# Patient Record
Sex: MALE | Race: WHITE | Employment: FULL TIME | URBAN - METROPOLITAN AREA
[De-identification: names, ages, dates, MRNs, and addresses within clinical notes are randomized per-mention and may not be internally consistent; named-entity substitution may affect disease eponyms.]

---

## 2019-03-19 ENCOUNTER — HOSPITAL ENCOUNTER (OUTPATIENT)
Dept: NUCLEAR MEDICINE | Age: 49
Discharge: HOME | End: 2019-03-21
Payer: COMMERCIAL

## 2019-03-19 ENCOUNTER — HOSPITAL ENCOUNTER (OUTPATIENT)
Dept: ULTRASOUND IMAGING | Age: 49
Discharge: HOME | End: 2019-03-21
Payer: COMMERCIAL

## 2019-03-19 VITALS — BODY MASS INDEX: 32.18 KG/M2 | WEIGHT: 205 LBS | HEIGHT: 67 IN

## 2019-03-19 DIAGNOSIS — R10.11 RIGHT UPPER QUADRANT PAIN: ICD-10-CM

## 2019-03-19 DIAGNOSIS — R10.11 RUQ PAIN: ICD-10-CM

## 2019-03-19 PROCEDURE — 3430000000 HC RX DIAGNOSTIC RADIOPHARMACEUTICAL: Performed by: SURGERY

## 2019-03-19 PROCEDURE — A9537 TC99M MEBROFENIN: HCPCS | Performed by: SURGERY

## 2019-03-19 PROCEDURE — 78227 HEPATOBIL SYST IMAGE W/DRUG: CPT

## 2019-03-19 PROCEDURE — 76705 ECHO EXAM OF ABDOMEN: CPT

## 2019-03-19 PROCEDURE — 2580000003 HC RX 258: Performed by: SURGERY

## 2019-03-19 RX ORDER — SODIUM CHLORIDE 0.9 % (FLUSH) 0.9 %
10 SYRINGE (ML) INJECTION PRN
Status: DISCONTINUED | OUTPATIENT
Start: 2019-03-19 | End: 2019-03-22 | Stop reason: HOSPADM

## 2019-03-19 RX ADMIN — Medication 3.2 MILLICURIE: at 11:10

## 2019-03-19 RX ADMIN — Medication 10 ML: at 11:09

## 2019-03-19 RX ADMIN — Medication 10 ML: at 11:10

## 2019-03-22 ENCOUNTER — ANESTHESIA (OUTPATIENT)
Dept: OPERATING ROOM | Age: 49
End: 2019-03-22
Payer: COMMERCIAL

## 2019-03-22 ENCOUNTER — ANESTHESIA EVENT (OUTPATIENT)
Dept: OPERATING ROOM | Age: 49
End: 2019-03-22
Payer: COMMERCIAL

## 2019-03-22 ENCOUNTER — HOSPITAL ENCOUNTER (OUTPATIENT)
Age: 49
Setting detail: OUTPATIENT SURGERY
Discharge: HOME | End: 2019-03-22
Attending: SURGERY | Admitting: SURGERY
Payer: COMMERCIAL

## 2019-03-22 VITALS — OXYGEN SATURATION: 100 % | SYSTOLIC BLOOD PRESSURE: 112 MMHG | TEMPERATURE: 96.4 F | DIASTOLIC BLOOD PRESSURE: 67 MMHG

## 2019-03-22 VITALS
WEIGHT: 205 LBS | BODY MASS INDEX: 32.18 KG/M2 | RESPIRATION RATE: 16 BRPM | HEART RATE: 63 BPM | TEMPERATURE: 96.9 F | HEIGHT: 67 IN | SYSTOLIC BLOOD PRESSURE: 119 MMHG | DIASTOLIC BLOOD PRESSURE: 66 MMHG | OXYGEN SATURATION: 100 %

## 2019-03-22 DIAGNOSIS — K42.9 UMBILICAL HERNIA WITHOUT OBSTRUCTION AND WITHOUT GANGRENE: Primary | ICD-10-CM

## 2019-03-22 LAB
ABSOLUTE EOS #: 0.2 K/UL (ref 0–0.4)
ABSOLUTE IMMATURE GRANULOCYTE: NORMAL K/UL (ref 0–0.3)
ABSOLUTE LYMPH #: 1.8 K/UL (ref 1–4.8)
ABSOLUTE MONO #: 0.3 K/UL (ref 0.1–1.3)
BASOPHILS # BLD: 1 % (ref 0–2)
BASOPHILS ABSOLUTE: 0.1 K/UL (ref 0–0.2)
DIFFERENTIAL TYPE: NORMAL
EOSINOPHILS RELATIVE PERCENT: 3 % (ref 0–4)
HCT VFR BLD CALC: 42.1 % (ref 41–53)
HEMOGLOBIN: 14.6 G/DL (ref 13.5–17.5)
IMMATURE GRANULOCYTES: NORMAL %
LYMPHOCYTES # BLD: 26 % (ref 24–44)
MCH RBC QN AUTO: 32.4 PG (ref 26–34)
MCHC RBC AUTO-ENTMCNC: 34.7 G/DL (ref 31–37)
MCV RBC AUTO: 93.3 FL (ref 80–100)
MONOCYTES # BLD: 5 % (ref 1–7)
NRBC AUTOMATED: NORMAL PER 100 WBC
PDW BLD-RTO: 12.4 % (ref 11.5–14.9)
PLATELET # BLD: 174 K/UL (ref 150–450)
PLATELET ESTIMATE: NORMAL
PMV BLD AUTO: 8.1 FL (ref 6–12)
RBC # BLD: 4.51 M/UL (ref 4.5–5.9)
RBC # BLD: NORMAL 10*6/UL
SEG NEUTROPHILS: 65 % (ref 36–66)
SEGMENTED NEUTROPHILS ABSOLUTE COUNT: 4.6 K/UL (ref 1.3–9.1)
WBC # BLD: 7.1 K/UL (ref 3.5–11)
WBC # BLD: NORMAL 10*3/UL

## 2019-03-22 PROCEDURE — 2500000003 HC RX 250 WO HCPCS: Performed by: SURGERY

## 2019-03-22 PROCEDURE — 2709999900 HC NON-CHARGEABLE SUPPLY: Performed by: SURGERY

## 2019-03-22 PROCEDURE — 2500000003 HC RX 250 WO HCPCS: Performed by: NURSE ANESTHETIST, CERTIFIED REGISTERED

## 2019-03-22 PROCEDURE — 3600000002 HC SURGERY LEVEL 2 BASE: Performed by: SURGERY

## 2019-03-22 PROCEDURE — 7100000000 HC PACU RECOVERY - FIRST 15 MIN: Performed by: SURGERY

## 2019-03-22 PROCEDURE — 85025 COMPLETE CBC W/AUTO DIFF WBC: CPT

## 2019-03-22 PROCEDURE — 36415 COLL VENOUS BLD VENIPUNCTURE: CPT

## 2019-03-22 PROCEDURE — 7100000031 HC ASPR PHASE II RECOVERY - ADDTL 15 MIN: Performed by: SURGERY

## 2019-03-22 PROCEDURE — 3700000001 HC ADD 15 MINUTES (ANESTHESIA): Performed by: SURGERY

## 2019-03-22 PROCEDURE — C1781 MESH (IMPLANTABLE): HCPCS | Performed by: SURGERY

## 2019-03-22 PROCEDURE — 2580000003 HC RX 258: Performed by: ANESTHESIOLOGY

## 2019-03-22 PROCEDURE — 7100000011 HC PHASE II RECOVERY - ADDTL 15 MIN: Performed by: SURGERY

## 2019-03-22 PROCEDURE — 6360000002 HC RX W HCPCS: Performed by: SURGERY

## 2019-03-22 PROCEDURE — 7100000010 HC PHASE II RECOVERY - FIRST 15 MIN: Performed by: SURGERY

## 2019-03-22 PROCEDURE — 3700000000 HC ANESTHESIA ATTENDED CARE: Performed by: SURGERY

## 2019-03-22 PROCEDURE — 3600000012 HC SURGERY LEVEL 2 ADDTL 15MIN: Performed by: SURGERY

## 2019-03-22 PROCEDURE — 6360000002 HC RX W HCPCS: Performed by: NURSE ANESTHETIST, CERTIFIED REGISTERED

## 2019-03-22 PROCEDURE — 2580000003 HC RX 258: Performed by: NURSE ANESTHETIST, CERTIFIED REGISTERED

## 2019-03-22 PROCEDURE — 6360000002 HC RX W HCPCS: Performed by: ANESTHESIOLOGY

## 2019-03-22 PROCEDURE — 7100000001 HC PACU RECOVERY - ADDTL 15 MIN: Performed by: SURGERY

## 2019-03-22 PROCEDURE — 6370000000 HC RX 637 (ALT 250 FOR IP): Performed by: ANESTHESIOLOGY

## 2019-03-22 PROCEDURE — 7100000030 HC ASPR PHASE II RECOVERY - FIRST 15 MIN: Performed by: SURGERY

## 2019-03-22 DEVICE — IMPLANTABLE DEVICE: Type: IMPLANTABLE DEVICE | Site: ABDOMEN | Status: FUNCTIONAL

## 2019-03-22 RX ORDER — LIDOCAINE HYDROCHLORIDE 10 MG/ML
INJECTION, SOLUTION EPIDURAL; INFILTRATION; INTRACAUDAL; PERINEURAL PRN
Status: DISCONTINUED | OUTPATIENT
Start: 2019-03-22 | End: 2019-03-22 | Stop reason: SDUPTHER

## 2019-03-22 RX ORDER — MIDAZOLAM HYDROCHLORIDE 1 MG/ML
INJECTION INTRAMUSCULAR; INTRAVENOUS PRN
Status: DISCONTINUED | OUTPATIENT
Start: 2019-03-22 | End: 2019-03-22 | Stop reason: SDUPTHER

## 2019-03-22 RX ORDER — HYDRALAZINE HYDROCHLORIDE 20 MG/ML
5 INJECTION INTRAMUSCULAR; INTRAVENOUS EVERY 10 MIN PRN
Status: DISCONTINUED | OUTPATIENT
Start: 2019-03-22 | End: 2019-03-22 | Stop reason: HOSPADM

## 2019-03-22 RX ORDER — FENTANYL CITRATE 50 UG/ML
25 INJECTION, SOLUTION INTRAMUSCULAR; INTRAVENOUS EVERY 5 MIN PRN
Status: DISCONTINUED | OUTPATIENT
Start: 2019-03-22 | End: 2019-03-22 | Stop reason: HOSPADM

## 2019-03-22 RX ORDER — IBUPROFEN 400 MG/1
400 TABLET ORAL EVERY 6 HOURS PRN
COMMUNITY

## 2019-03-22 RX ORDER — ONDANSETRON 2 MG/ML
INJECTION INTRAMUSCULAR; INTRAVENOUS PRN
Status: DISCONTINUED | OUTPATIENT
Start: 2019-03-22 | End: 2019-03-22 | Stop reason: SDUPTHER

## 2019-03-22 RX ORDER — LABETALOL HYDROCHLORIDE 5 MG/ML
5 INJECTION, SOLUTION INTRAVENOUS EVERY 10 MIN PRN
Status: DISCONTINUED | OUTPATIENT
Start: 2019-03-22 | End: 2019-03-22 | Stop reason: HOSPADM

## 2019-03-22 RX ORDER — CEPHALEXIN 500 MG/1
CAPSULE ORAL
Qty: 21 CAPSULE | Refills: 0 | Status: ON HOLD | OUTPATIENT
Start: 2019-03-22 | End: 2021-12-03 | Stop reason: ALTCHOICE

## 2019-03-22 RX ORDER — ONDANSETRON 2 MG/ML
4 INJECTION INTRAMUSCULAR; INTRAVENOUS
Status: DISCONTINUED | OUTPATIENT
Start: 2019-03-22 | End: 2019-03-22 | Stop reason: HOSPADM

## 2019-03-22 RX ORDER — HYDROCODONE BITARTRATE AND ACETAMINOPHEN 5; 325 MG/1; MG/1
1 TABLET ORAL EVERY 4 HOURS PRN
Qty: 30 TABLET | Refills: 0 | Status: SHIPPED | OUTPATIENT
Start: 2019-03-22 | End: 2019-03-27

## 2019-03-22 RX ORDER — SODIUM CHLORIDE, SODIUM LACTATE, POTASSIUM CHLORIDE, CALCIUM CHLORIDE 600; 310; 30; 20 MG/100ML; MG/100ML; MG/100ML; MG/100ML
INJECTION, SOLUTION INTRAVENOUS CONTINUOUS
Status: DISCONTINUED | OUTPATIENT
Start: 2019-03-22 | End: 2019-03-22 | Stop reason: HOSPADM

## 2019-03-22 RX ORDER — FENTANYL CITRATE 50 UG/ML
50 INJECTION, SOLUTION INTRAMUSCULAR; INTRAVENOUS EVERY 5 MIN PRN
Status: DISCONTINUED | OUTPATIENT
Start: 2019-03-22 | End: 2019-03-22 | Stop reason: HOSPADM

## 2019-03-22 RX ORDER — ACETAMINOPHEN 500 MG
500 TABLET ORAL EVERY 6 HOURS PRN
COMMUNITY

## 2019-03-22 RX ORDER — MORPHINE SULFATE 2 MG/ML
2 INJECTION, SOLUTION INTRAMUSCULAR; INTRAVENOUS EVERY 5 MIN PRN
Status: DISCONTINUED | OUTPATIENT
Start: 2019-03-22 | End: 2019-03-22 | Stop reason: HOSPADM

## 2019-03-22 RX ORDER — ONDANSETRON 4 MG/1
TABLET, FILM COATED ORAL
Qty: 20 TABLET | Refills: 0 | Status: ON HOLD | OUTPATIENT
Start: 2019-03-22 | End: 2021-12-03 | Stop reason: ALTCHOICE

## 2019-03-22 RX ORDER — SCOLOPAMINE TRANSDERMAL SYSTEM 1 MG/1
1 PATCH, EXTENDED RELEASE TRANSDERMAL ONCE
Status: DISCONTINUED | OUTPATIENT
Start: 2019-03-22 | End: 2019-03-22 | Stop reason: HOSPADM

## 2019-03-22 RX ORDER — KETOROLAC TROMETHAMINE 30 MG/ML
INJECTION, SOLUTION INTRAMUSCULAR; INTRAVENOUS PRN
Status: DISCONTINUED | OUTPATIENT
Start: 2019-03-22 | End: 2019-03-22 | Stop reason: SDUPTHER

## 2019-03-22 RX ORDER — GLYCOPYRROLATE 1 MG/5 ML
SYRINGE (ML) INTRAVENOUS PRN
Status: DISCONTINUED | OUTPATIENT
Start: 2019-03-22 | End: 2019-03-22 | Stop reason: SDUPTHER

## 2019-03-22 RX ORDER — PROPOFOL 10 MG/ML
INJECTION, EMULSION INTRAVENOUS PRN
Status: DISCONTINUED | OUTPATIENT
Start: 2019-03-22 | End: 2019-03-22 | Stop reason: SDUPTHER

## 2019-03-22 RX ORDER — ROCURONIUM BROMIDE 10 MG/ML
INJECTION, SOLUTION INTRAVENOUS PRN
Status: DISCONTINUED | OUTPATIENT
Start: 2019-03-22 | End: 2019-03-22 | Stop reason: SDUPTHER

## 2019-03-22 RX ORDER — DEXAMETHASONE SODIUM PHOSPHATE 4 MG/ML
INJECTION, SOLUTION INTRA-ARTICULAR; INTRALESIONAL; INTRAMUSCULAR; INTRAVENOUS; SOFT TISSUE PRN
Status: DISCONTINUED | OUTPATIENT
Start: 2019-03-22 | End: 2019-03-22 | Stop reason: SDUPTHER

## 2019-03-22 RX ORDER — SODIUM CHLORIDE, SODIUM LACTATE, POTASSIUM CHLORIDE, CALCIUM CHLORIDE 600; 310; 30; 20 MG/100ML; MG/100ML; MG/100ML; MG/100ML
INJECTION, SOLUTION INTRAVENOUS CONTINUOUS PRN
Status: DISCONTINUED | OUTPATIENT
Start: 2019-03-22 | End: 2019-03-22 | Stop reason: SDUPTHER

## 2019-03-22 RX ORDER — BUPIVACAINE HYDROCHLORIDE 5 MG/ML
INJECTION, SOLUTION EPIDURAL; INTRACAUDAL PRN
Status: DISCONTINUED | OUTPATIENT
Start: 2019-03-22 | End: 2019-03-22 | Stop reason: ALTCHOICE

## 2019-03-22 RX ORDER — METOCLOPRAMIDE HYDROCHLORIDE 5 MG/ML
10 INJECTION INTRAMUSCULAR; INTRAVENOUS
Status: DISCONTINUED | OUTPATIENT
Start: 2019-03-22 | End: 2019-03-22 | Stop reason: HOSPADM

## 2019-03-22 RX ORDER — NEOSTIGMINE METHYLSULFATE 5 MG/5 ML
SYRINGE (ML) INTRAVENOUS PRN
Status: DISCONTINUED | OUTPATIENT
Start: 2019-03-22 | End: 2019-03-22 | Stop reason: SDUPTHER

## 2019-03-22 RX ORDER — FENTANYL CITRATE 50 UG/ML
INJECTION, SOLUTION INTRAMUSCULAR; INTRAVENOUS PRN
Status: DISCONTINUED | OUTPATIENT
Start: 2019-03-22 | End: 2019-03-22 | Stop reason: SDUPTHER

## 2019-03-22 RX ORDER — DIPHENHYDRAMINE HYDROCHLORIDE 50 MG/ML
12.5 INJECTION INTRAMUSCULAR; INTRAVENOUS
Status: DISCONTINUED | OUTPATIENT
Start: 2019-03-22 | End: 2019-03-22 | Stop reason: HOSPADM

## 2019-03-22 RX ORDER — MEPERIDINE HYDROCHLORIDE 50 MG/ML
12.5 INJECTION INTRAMUSCULAR; INTRAVENOUS; SUBCUTANEOUS EVERY 5 MIN PRN
Status: DISCONTINUED | OUTPATIENT
Start: 2019-03-22 | End: 2019-03-22 | Stop reason: HOSPADM

## 2019-03-22 RX ADMIN — LIDOCAINE HYDROCHLORIDE 50 MG: 10 INJECTION, SOLUTION EPIDURAL; INFILTRATION; INTRACAUDAL; PERINEURAL at 13:52

## 2019-03-22 RX ADMIN — HYDROMORPHONE HYDROCHLORIDE 0.5 MG: 1 INJECTION, SOLUTION INTRAMUSCULAR; INTRAVENOUS; SUBCUTANEOUS at 15:06

## 2019-03-22 RX ADMIN — Medication 2 G: at 14:05

## 2019-03-22 RX ADMIN — ROCURONIUM BROMIDE 40 MG: 10 INJECTION INTRAVENOUS at 13:52

## 2019-03-22 RX ADMIN — DEXAMETHASONE SODIUM PHOSPHATE 4 MG: 4 INJECTION, SOLUTION INTRAMUSCULAR; INTRAVENOUS at 14:00

## 2019-03-22 RX ADMIN — Medication 0.6 MG: at 14:42

## 2019-03-22 RX ADMIN — FENTANYL CITRATE 50 MCG: 50 INJECTION, SOLUTION INTRAMUSCULAR; INTRAVENOUS at 14:07

## 2019-03-22 RX ADMIN — FENTANYL CITRATE 100 MCG: 50 INJECTION, SOLUTION INTRAMUSCULAR; INTRAVENOUS at 13:52

## 2019-03-22 RX ADMIN — KETOROLAC TROMETHAMINE 30 MG: 30 INJECTION, SOLUTION INTRAMUSCULAR at 14:32

## 2019-03-22 RX ADMIN — SODIUM CHLORIDE, POTASSIUM CHLORIDE, SODIUM LACTATE AND CALCIUM CHLORIDE: 600; 310; 30; 20 INJECTION, SOLUTION INTRAVENOUS at 13:50

## 2019-03-22 RX ADMIN — SODIUM CHLORIDE, POTASSIUM CHLORIDE, SODIUM LACTATE AND CALCIUM CHLORIDE: 600; 310; 30; 20 INJECTION, SOLUTION INTRAVENOUS at 14:17

## 2019-03-22 RX ADMIN — MIDAZOLAM 2 MG: 1 INJECTION INTRAMUSCULAR; INTRAVENOUS at 13:50

## 2019-03-22 RX ADMIN — SODIUM CHLORIDE, POTASSIUM CHLORIDE, SODIUM LACTATE AND CALCIUM CHLORIDE: 600; 310; 30; 20 INJECTION, SOLUTION INTRAVENOUS at 12:15

## 2019-03-22 RX ADMIN — FENTANYL CITRATE 50 MCG: 50 INJECTION, SOLUTION INTRAMUSCULAR; INTRAVENOUS at 14:51

## 2019-03-22 RX ADMIN — Medication 3 MG: at 14:42

## 2019-03-22 RX ADMIN — ROCURONIUM BROMIDE 10 MG: 10 INJECTION INTRAVENOUS at 14:20

## 2019-03-22 RX ADMIN — PROPOFOL 200 MG: 10 INJECTION, EMULSION INTRAVENOUS at 13:52

## 2019-03-22 RX ADMIN — ONDANSETRON 4 MG: 2 INJECTION INTRAMUSCULAR; INTRAVENOUS at 14:28

## 2019-03-22 ASSESSMENT — PULMONARY FUNCTION TESTS
PIF_VALUE: 12
PIF_VALUE: 16
PIF_VALUE: 15
PIF_VALUE: 16
PIF_VALUE: 12
PIF_VALUE: 17
PIF_VALUE: 16
PIF_VALUE: 16
PIF_VALUE: 15
PIF_VALUE: 12
PIF_VALUE: 16
PIF_VALUE: 16
PIF_VALUE: 7
PIF_VALUE: 16
PIF_VALUE: 1
PIF_VALUE: 12
PIF_VALUE: 16
PIF_VALUE: 12
PIF_VALUE: 12
PIF_VALUE: 14
PIF_VALUE: 1
PIF_VALUE: 10
PIF_VALUE: 16
PIF_VALUE: 31
PIF_VALUE: 16
PIF_VALUE: 12
PIF_VALUE: 16
PIF_VALUE: 15
PIF_VALUE: 25
PIF_VALUE: 1
PIF_VALUE: 16
PIF_VALUE: 1
PIF_VALUE: 16
PIF_VALUE: 12
PIF_VALUE: 27
PIF_VALUE: 17
PIF_VALUE: 16
PIF_VALUE: 17
PIF_VALUE: 17
PIF_VALUE: 16
PIF_VALUE: 25
PIF_VALUE: 13
PIF_VALUE: 17
PIF_VALUE: 16
PIF_VALUE: 16
PIF_VALUE: 15
PIF_VALUE: 12
PIF_VALUE: 16
PIF_VALUE: 12
PIF_VALUE: 16

## 2019-03-22 ASSESSMENT — ENCOUNTER SYMPTOMS
SINUS PAIN: 0
ALLERGIC/IMMUNOLOGIC NEGATIVE: 1
RESPIRATORY NEGATIVE: 1
SORE THROAT: 0

## 2019-03-22 ASSESSMENT — PAIN SCALES - GENERAL
PAINLEVEL_OUTOF10: 4
PAINLEVEL_OUTOF10: 4
PAINLEVEL_OUTOF10: 7
PAINLEVEL_OUTOF10: 4

## 2019-03-22 ASSESSMENT — PAIN DESCRIPTION - ORIENTATION: ORIENTATION: MID

## 2019-03-22 ASSESSMENT — PAIN - FUNCTIONAL ASSESSMENT: PAIN_FUNCTIONAL_ASSESSMENT: 0-10

## 2019-03-22 ASSESSMENT — PAIN DESCRIPTION - LOCATION
LOCATION: UMBILICUS
LOCATION: ABDOMEN

## 2019-03-22 ASSESSMENT — LIFESTYLE VARIABLES: SMOKING_STATUS: 1

## 2019-03-22 ASSESSMENT — PAIN DESCRIPTION - PAIN TYPE
TYPE: SURGICAL PAIN
TYPE: ACUTE PAIN;SURGICAL PAIN

## 2021-11-22 ENCOUNTER — HOSPITAL ENCOUNTER (OUTPATIENT)
Dept: PREADMISSION TESTING | Age: 51
Discharge: HOME | End: 2021-11-26

## 2021-11-22 VITALS — BODY MASS INDEX: 27.92 KG/M2 | WEIGHT: 195 LBS | HEIGHT: 70 IN

## 2021-11-22 NOTE — PROGRESS NOTES
Pre-op Instructions For Out-Patient Surgery    Medication Instructions:  · Please stop herbs and any supplements now (includes vitamins and minerals). · Please contact your surgeon and prescribing physician for pre-op instructions for any blood thinners. Ibuprofen    · If you have inhalers/aerosol treatments at home, please use them the morning of your surgery and bring the inhalers with you to the hospital.    · Please take the following medications the morning of your surgery with a sip of water:    None    Surgery Instructions:  1. After midnight before surgery:  Do not eat or drink anything, including water, mints, gum, and hard candy. You may brush your teeth without swallowing. No smoking, chewing tobacco, or street drugs. 2. Please shower or bathe before surgery. 3. Please do not wear any cologne, lotion, powder, deodorant, jewelry, piercings, perfume, makeup, nail polish, hair accessories, or hair spray on the day of surgery. Wear loose comfortable clothing. 4. Leave your valuables at home. Bring a storage case for any glasses/contacts. 5. An adult who is responsible for you MUST drive you home and should be with you for the first 24 hours after surgery. 6. If having out-patient knee and foot surgeries, please arrange for planned crutches, walker, or wheelchair before arriving to the hospital.    The Day of Surgery:  · Arrive at [address] [place] [address] Surgery Entrance at the time directed by your surgeon and check in at the desk. · If you have a living will or healthcare power of , please bring a copy. · You will be taken to the pre-op holding area where you will be prepared for surgery. A physical assessment will be performed by a nurse practitioner or house officer.   Your IV will be started and you will meet your anesthesiologist.    · When you go to surgery, your family will be directed to the surgical waiting room, where the doctor should speak with them after your surgery. · After surgery, you will be taken to the recovery room then when you are awake and stable you will go to the short stay unit for preparation to be discharged. Only your one designated person is allowed to come to short stay for your discharge. · If you use a Bi-PAP or C-PAP machine, please bring it with you and leave it in the car in case it is needed in recovery room. Instructions read to pt wife, Jaspal Velasquez and understanding verbalized.

## 2021-12-02 ENCOUNTER — ANESTHESIA EVENT (OUTPATIENT)
Dept: ENDOSCOPY | Age: 51
End: 2021-12-02
Payer: COMMERCIAL

## 2021-12-03 ENCOUNTER — HOSPITAL ENCOUNTER (OUTPATIENT)
Age: 51
Setting detail: OUTPATIENT SURGERY
Discharge: HOME | End: 2021-12-03
Attending: SURGERY | Admitting: SURGERY
Payer: COMMERCIAL

## 2021-12-03 ENCOUNTER — ANESTHESIA (OUTPATIENT)
Dept: ENDOSCOPY | Age: 51
End: 2021-12-03
Payer: COMMERCIAL

## 2021-12-03 VITALS
HEIGHT: 70 IN | WEIGHT: 195 LBS | RESPIRATION RATE: 18 BRPM | DIASTOLIC BLOOD PRESSURE: 56 MMHG | TEMPERATURE: 96.8 F | SYSTOLIC BLOOD PRESSURE: 129 MMHG | HEART RATE: 75 BPM | OXYGEN SATURATION: 98 % | BODY MASS INDEX: 27.92 KG/M2

## 2021-12-03 VITALS — SYSTOLIC BLOOD PRESSURE: 137 MMHG | OXYGEN SATURATION: 100 % | TEMPERATURE: 95.4 F | DIASTOLIC BLOOD PRESSURE: 86 MMHG

## 2021-12-03 PROCEDURE — 2580000003 HC RX 258: Performed by: ANESTHESIOLOGY

## 2021-12-03 PROCEDURE — 3700000001 HC ADD 15 MINUTES (ANESTHESIA): Performed by: SURGERY

## 2021-12-03 PROCEDURE — 7100000010 HC PHASE II RECOVERY - FIRST 15 MIN: Performed by: SURGERY

## 2021-12-03 PROCEDURE — 7100000031 HC ASPR PHASE II RECOVERY - ADDTL 15 MIN: Performed by: SURGERY

## 2021-12-03 PROCEDURE — 88305 TISSUE EXAM BY PATHOLOGIST: CPT

## 2021-12-03 PROCEDURE — 6370000000 HC RX 637 (ALT 250 FOR IP): Performed by: ANESTHESIOLOGY

## 2021-12-03 PROCEDURE — 6360000002 HC RX W HCPCS: Performed by: NURSE ANESTHETIST, CERTIFIED REGISTERED

## 2021-12-03 PROCEDURE — 7100000030 HC ASPR PHASE II RECOVERY - FIRST 15 MIN: Performed by: SURGERY

## 2021-12-03 PROCEDURE — 3700000000 HC ANESTHESIA ATTENDED CARE: Performed by: SURGERY

## 2021-12-03 PROCEDURE — 2709999900 HC NON-CHARGEABLE SUPPLY: Performed by: SURGERY

## 2021-12-03 PROCEDURE — 7100000000 HC PACU RECOVERY - FIRST 15 MIN: Performed by: SURGERY

## 2021-12-03 PROCEDURE — 7100000001 HC PACU RECOVERY - ADDTL 15 MIN: Performed by: SURGERY

## 2021-12-03 PROCEDURE — 3609010600 HC COLONOSCOPY POLYPECTOMY SNARE/COLD BIOPSY: Performed by: SURGERY

## 2021-12-03 PROCEDURE — 7100000011 HC PHASE II RECOVERY - ADDTL 15 MIN: Performed by: SURGERY

## 2021-12-03 RX ORDER — SODIUM CHLORIDE 9 MG/ML
25 INJECTION, SOLUTION INTRAVENOUS PRN
Status: DISCONTINUED | OUTPATIENT
Start: 2021-12-03 | End: 2021-12-03 | Stop reason: HOSPADM

## 2021-12-03 RX ORDER — PROPOFOL 10 MG/ML
INJECTION, EMULSION INTRAVENOUS PRN
Status: DISCONTINUED | OUTPATIENT
Start: 2021-12-03 | End: 2021-12-03 | Stop reason: SDUPTHER

## 2021-12-03 RX ORDER — MIDAZOLAM HYDROCHLORIDE 1 MG/ML
INJECTION INTRAMUSCULAR; INTRAVENOUS PRN
Status: DISCONTINUED | OUTPATIENT
Start: 2021-12-03 | End: 2021-12-03 | Stop reason: SDUPTHER

## 2021-12-03 RX ORDER — LIDOCAINE HYDROCHLORIDE 10 MG/ML
1 INJECTION, SOLUTION EPIDURAL; INFILTRATION; INTRACAUDAL; PERINEURAL
Status: DISCONTINUED | OUTPATIENT
Start: 2021-12-03 | End: 2021-12-03 | Stop reason: HOSPADM

## 2021-12-03 RX ORDER — SCOLOPAMINE TRANSDERMAL SYSTEM 1 MG/1
1 PATCH, EXTENDED RELEASE TRANSDERMAL ONCE
Status: DISCONTINUED | OUTPATIENT
Start: 2021-12-03 | End: 2021-12-03 | Stop reason: HOSPADM

## 2021-12-03 RX ORDER — DEXAMETHASONE SODIUM PHOSPHATE 4 MG/ML
INJECTION, SOLUTION INTRA-ARTICULAR; INTRALESIONAL; INTRAMUSCULAR; INTRAVENOUS; SOFT TISSUE PRN
Status: DISCONTINUED | OUTPATIENT
Start: 2021-12-03 | End: 2021-12-03 | Stop reason: SDUPTHER

## 2021-12-03 RX ORDER — SODIUM CHLORIDE 0.9 % (FLUSH) 0.9 %
5-40 SYRINGE (ML) INJECTION PRN
Status: DISCONTINUED | OUTPATIENT
Start: 2021-12-03 | End: 2021-12-03 | Stop reason: HOSPADM

## 2021-12-03 RX ORDER — ONDANSETRON 2 MG/ML
INJECTION INTRAMUSCULAR; INTRAVENOUS PRN
Status: DISCONTINUED | OUTPATIENT
Start: 2021-12-03 | End: 2021-12-03 | Stop reason: SDUPTHER

## 2021-12-03 RX ORDER — SODIUM CHLORIDE, SODIUM LACTATE, POTASSIUM CHLORIDE, CALCIUM CHLORIDE 600; 310; 30; 20 MG/100ML; MG/100ML; MG/100ML; MG/100ML
INJECTION, SOLUTION INTRAVENOUS CONTINUOUS
Status: DISCONTINUED | OUTPATIENT
Start: 2021-12-03 | End: 2021-12-03 | Stop reason: HOSPADM

## 2021-12-03 RX ORDER — FENTANYL CITRATE 50 UG/ML
INJECTION, SOLUTION INTRAMUSCULAR; INTRAVENOUS PRN
Status: DISCONTINUED | OUTPATIENT
Start: 2021-12-03 | End: 2021-12-03 | Stop reason: SDUPTHER

## 2021-12-03 RX ORDER — SODIUM CHLORIDE 0.9 % (FLUSH) 0.9 %
5-40 SYRINGE (ML) INJECTION EVERY 12 HOURS SCHEDULED
Status: DISCONTINUED | OUTPATIENT
Start: 2021-12-03 | End: 2021-12-03 | Stop reason: HOSPADM

## 2021-12-03 RX ORDER — LABETALOL HYDROCHLORIDE 5 MG/ML
5 INJECTION, SOLUTION INTRAVENOUS EVERY 10 MIN PRN
Status: DISCONTINUED | OUTPATIENT
Start: 2021-12-03 | End: 2021-12-03 | Stop reason: HOSPADM

## 2021-12-03 RX ORDER — DIPHENHYDRAMINE HYDROCHLORIDE 50 MG/ML
12.5 INJECTION INTRAMUSCULAR; INTRAVENOUS
Status: DISCONTINUED | OUTPATIENT
Start: 2021-12-03 | End: 2021-12-03 | Stop reason: HOSPADM

## 2021-12-03 RX ORDER — ONDANSETRON 2 MG/ML
4 INJECTION INTRAMUSCULAR; INTRAVENOUS
Status: DISCONTINUED | OUTPATIENT
Start: 2021-12-03 | End: 2021-12-03 | Stop reason: HOSPADM

## 2021-12-03 RX ADMIN — MIDAZOLAM 2 MG: 1 INJECTION INTRAMUSCULAR; INTRAVENOUS at 11:56

## 2021-12-03 RX ADMIN — DEXAMETHASONE SODIUM PHOSPHATE 4 MG: 4 INJECTION, SOLUTION INTRAMUSCULAR; INTRAVENOUS at 12:03

## 2021-12-03 RX ADMIN — SODIUM CHLORIDE, POTASSIUM CHLORIDE, SODIUM LACTATE AND CALCIUM CHLORIDE: 600; 310; 30; 20 INJECTION, SOLUTION INTRAVENOUS at 10:40

## 2021-12-03 RX ADMIN — FENTANYL CITRATE 50 MCG: 50 INJECTION, SOLUTION INTRAMUSCULAR; INTRAVENOUS at 11:56

## 2021-12-03 RX ADMIN — ONDANSETRON 4 MG: 2 INJECTION, SOLUTION INTRAMUSCULAR; INTRAVENOUS at 12:03

## 2021-12-03 RX ADMIN — SODIUM CHLORIDE, POTASSIUM CHLORIDE, SODIUM LACTATE AND CALCIUM CHLORIDE: 600; 310; 30; 20 INJECTION, SOLUTION INTRAVENOUS at 12:37

## 2021-12-03 RX ADMIN — PROPOFOL 200 MG: 10 INJECTION, EMULSION INTRAVENOUS at 11:59

## 2021-12-03 ASSESSMENT — PULMONARY FUNCTION TESTS
PIF_VALUE: 10
PIF_VALUE: 10
PIF_VALUE: 11
PIF_VALUE: 10
PIF_VALUE: 11
PIF_VALUE: 10
PIF_VALUE: 1
PIF_VALUE: 10
PIF_VALUE: 0
PIF_VALUE: 10
PIF_VALUE: 11
PIF_VALUE: 10
PIF_VALUE: 10
PIF_VALUE: 11
PIF_VALUE: 10
PIF_VALUE: 10
PIF_VALUE: 9
PIF_VALUE: 10
PIF_VALUE: 1
PIF_VALUE: 1
PIF_VALUE: 9
PIF_VALUE: 10
PIF_VALUE: 10
PIF_VALUE: 7
PIF_VALUE: 10
PIF_VALUE: 1
PIF_VALUE: 10
PIF_VALUE: 6
PIF_VALUE: 10
PIF_VALUE: 10

## 2021-12-03 ASSESSMENT — LIFESTYLE VARIABLES: SMOKING_STATUS: 1

## 2021-12-03 ASSESSMENT — ENCOUNTER SYMPTOMS
CHEST TIGHTNESS: 0
SINUS PRESSURE: 0
BACK PAIN: 1
APNEA: 0
SORE THROAT: 0
RHINORRHEA: 0
SHORTNESS OF BREATH: 0
SINUS PAIN: 0
WHEEZING: 0
COUGH: 0
TROUBLE SWALLOWING: 0

## 2021-12-03 ASSESSMENT — PAIN - FUNCTIONAL ASSESSMENT: PAIN_FUNCTIONAL_ASSESSMENT: 0-10

## 2021-12-03 ASSESSMENT — PAIN SCALES - GENERAL: PAINLEVEL_OUTOF10: 0

## 2021-12-03 NOTE — ANESTHESIA POSTPROCEDURE EVALUATION
Department of Anesthesiology  Postprocedure Note    Patient: William Stratton  MRN: [id number]  YOB: 1970  Date of evaluation: 12/3/2021  Time:  2:54 PM     Procedure Summary     Date: 12/03/21 Room / Location: [place] [address] / [facility]    Anesthesia Start: 0345 Anesthesia Stop: 9351    Procedure: COLONOSCOPY POLYPECTOMY SNARE/COLD BIOPSY (N/A ) Diagnosis:       (COLON SCREENING)      (PT FULLY VACCINATED)    Surgeons: Jaime Delgado MD Responsible Provider: Sathya Licea MD    Anesthesia Type: general ASA Status: 2          Anesthesia Type: general    Katie Phase I: Katie Score: 10    Katie Phase II: Katie Score: 10    Last vitals: Reviewed and per EMR flowsheets.        Anesthesia Post Evaluation    Comments: POST- ANESTHESIA EVALUATION       Pt Name: William Stratton  MRN: [id number]  YOB: 1970  Date of evaluation: 12/3/2021  Time:  2:54 PM      BP (!) 129/56   Pulse 75   Temp 96.8 °F (36 °C)   Resp 18   Ht 5' 10\" (1.778 m)   Wt 195 lb (88.5 kg)   SpO2 98%   BMI 27.98 kg/m²      Consciousness Level  Awake  Cardiopulmonary Status  Stable  Pain Adequately Treated YES  Nausea / Vomiting  NO  Adequate Hydration  YES  Anesthesia Related Complications NONE      Electronically signed by Sathya Licea MD on 12/3/2021 at 2:54 PM

## 2021-12-03 NOTE — H&P
HISTORY and [address]       NAME:  Lynne Delgado  MRN: [id number]   YOB: 1970   Date: 12/3/2021   Age: 49 y.o. Gender: male       COMPLAINT AND PRESENT HISTORY:   Lynne Delgado  is a 49 y.o. male presenting today for a colorectal cancer screening. Pt denies any gi symptoms including n/v/d/c, no abdominal pain, no bloody or tarry stools. Pt is unaware of any family hx of colon cancer. This is his first colonoscopy. Pt reports completing bowel prep without complications, last BM reported this morning. He states last BM was clear with no stool particles. Pt has been NPO since midnight. Pt does not wear dentures. Pt denies any hx of MRSA infection  Pt does take motrin, last dose over a week ago. Pt does have a hx of PONV  Pt denies any acute symptoms of illness at this time including no SOB, CP, fever, URI or UTI symptoms. RECENT IMAGING    No results found. PAST MEDICAL HISTORY     Past Medical History:   Diagnosis Date    Umbilical hernia        SURGICAL HISTORY       Past Surgical History:   Procedure Laterality Date    ANTERIOR CRUCIATE LIGAMENT REPAIR Right 1997    ANTERIOR CRUCIATE LIGAMENT REPAIR      CERVICAL DISCECTOMY      x 2 (replaced with cadaver)    TONSILLECTOMY      UMBILICAL HERNIA REPAIR N/A 4/29/6315    HERNIA UMBILICAL REPAIR - WITH MESH performed by Chelsi Sofia MD at [address] [place] [address]     No family history on file.     SOCIAL HISTORY       Social History     Socioeconomic History    Marital status:      Spouse name: None    Number of children: None    Years of education: None    Highest education level: None   Occupational History    None   Tobacco Use    Smoking status: Current Every Day Smoker     Packs/day: 1.00    Smokeless tobacco: Never Used   Vaping Use    Vaping Use: Never used   Substance and Sexual Activity    Alcohol use: Yes     Comment: socially    Drug use: Never    Sexual activity: None   Other Topics Concern    None   Social History Narrative    None     Social Determinants of Health     Financial Resource Strain:     Difficulty of Paying Living Expenses: Not on file   Food Insecurity:     Worried About Running Out of Food in the Last Year: Not on file    Santos of Food in the Last Year: Not on file   Transportation Needs:     Lack of Transportation (Medical): Not on file    Lack of Transportation (Non-Medical): Not on file   Physical Activity:     Days of Exercise per Week: Not on file    Minutes of Exercise per Session: Not on file   Stress:     Feeling of Stress : Not on file   Social Connections:     Frequency of Communication with Friends and Family: Not on file    Frequency of Social Gatherings with Friends and Family: Not on file    Attends Mandaen Services: Not on file    Active Member of [address] [place] [address] or Organizations: Not on file    Attends Club or Organization Meetings: Not on file    Marital Status: Not on file   Intimate Partner Violence:     Fear of Current or Ex-Partner: Not on file    Emotionally Abused: Not on file    Physically Abused: Not on file    Sexually Abused: Not on file   Housing Stability:     Unable to Pay for Housing in the Last Year: Not on file    Number of [place] in the Last Year: Not on file    Unstable Housing in the Last Year: Not on file           REVIEW OF SYSTEMS      Allergies   Allergen Reactions    Codeine     Percocet [Oxycodone-Acetaminophen]        No current facility-administered medications on file prior to encounter.      Current Outpatient Medications on File Prior to Encounter   Medication Sig Dispense Refill    acetaminophen (TYLENOL) 500 MG tablet Take 500 mg by mouth every 6 hours as needed for Pain      ibuprofen (ADVIL;MOTRIN) 400 MG tablet Take 400 mg by mouth every 6 hours as needed for Pain      cephALEXin (KEFLEX) 500 MG capsule 500 mgTake three times daily 21 capsule 0    ondansetron (ZOFRAN) 4 MG tablet Take every six hours as needed 20 tablet 0        Review of Systems   Constitutional: Negative for chills, diaphoresis, fatigue and fever. HENT: Negative for congestion, dental problem, ear pain, postnasal drip, rhinorrhea, sinus pressure, sinus pain, sore throat and trouble swallowing. Respiratory: Negative for apnea, cough, chest tightness, shortness of breath and wheezing. Cardiovascular: Negative for chest pain, palpitations and leg swelling. Gastrointestinal:        SEE HPI   Genitourinary: Negative for dysuria, flank pain, frequency and hematuria. Musculoskeletal: Positive for back pain and neck pain. Negative for joint swelling and myalgias. Chronic   Skin: Negative for rash and wound. Neurological: Negative for dizziness, weakness, numbness and headaches. Hematological: Does not bruise/bleed easily. Psychiatric/Behavioral: Negative for agitation and confusion. The patient is not nervous/anxious. See HPI    GENERAL PHYSICAL EXAM:     Vitals: See nurse flowsheet for current vitals. Physical Exam  Constitutional:       General: He is not in acute distress. Appearance: Normal appearance. He is well-developed and normal weight. He is not ill-appearing or toxic-appearing. HENT:      Head: Normocephalic and atraumatic. Mouth/Throat:      Mouth: Mucous membranes are moist.      Pharynx: Oropharynx is clear. No oropharyngeal exudate or posterior oropharyngeal erythema. Eyes:      Extraocular Movements: Extraocular movements intact. Conjunctiva/sclera: Conjunctivae normal.      Pupils: Pupils are equal, round, and reactive to light. Cardiovascular:      Rate and Rhythm: Normal rate and regular rhythm. Pulses: Normal pulses. Heart sounds: Normal heart sounds. No murmur heard. No friction rub. No gallop. Pulmonary:      Effort: Pulmonary effort is normal.      Breath sounds: Wheezing present.       Comments: Posterior lung fields t/o  Abdominal:      General: Bowel sounds are normal. There is no distension. Palpations: Abdomen is soft. Tenderness: There is no abdominal tenderness. There is no guarding or rebound. Musculoskeletal:         General: No swelling. Normal range of motion. Cervical back: Normal range of motion and neck supple. No rigidity or tenderness. Right lower leg: No edema. Left lower leg: No edema. Skin:     General: Skin is warm and dry. Findings: No erythema. Neurological:      General: No focal deficit present. Mental Status: He is alert and oriented to person, place, and time. Mental status is at baseline. Sensory: No sensory deficit. Psychiatric:         Mood and Affect: Mood normal.         Behavior: Behavior normal.         Thought Content: Thought content normal.         Judgment: Judgment normal.                                                                                         PROVISIONAL DIAGNOSES / SURGERY:      COLORECTAL CANCER SCREENING, NOT HIGH RISK    COLON SCREENING    There are no problems to display for this patient.               JOHN Rubio - CNP on 12/3/2021 at 9:55 AM

## 2021-12-03 NOTE — OP NOTE
Operative Note      Patient: Jaydon Graham  YOB: 1970  MRN: [id number]    Date of Procedure: 12/3/2021    PROCEDURE NOTE    DATE OF PROCEDURE: 12/3/2021    SURGEON: Saida Valderrama MD    ASSISTANT: None    PREOPERATIVE DIAGNOSIS: Screening colonoscopy    POSTOPERATIVE DIAGNOSIS: Rectosigmoid polyp    OPERATION: Total colonoscopy to cecum with intubation of terminal ileum. Rectosigmoid polypectomy with cold snare polypectomy technique. ANESTHESIA: General    ESTIMATED BLOOD LOSS: None    COMPLICATIONS: None     SPECIMENS:  Was Obtained: Rectosigmoid polyp    HISTORY: The patient is a 49y.o. year old male with history of above preop diagnosis. I recommended colonoscopy with possible biopsy or polypectomy and I explained the risk, benefits, expected outcome, and alternatives to the procedure. Risks included but are not limited to bleeding, infection, respiratory distress, hypotension, and perforation of the colon and possibility of missing a lesion. The patient understands and is in agreement. PROCEDURE: The patient was given IV conscious sedation. The patient's SPO2 remained above 90% throughout the procedure. Digital rectal exam was normal.  The colonoscope was inserted through the anus into the rectum and advanced under direct vision to the cecum without difficulty. Terminal ileum was examined for approximately 2 inches. The prep was good. Findings:  Terminal ileum: normal    Cecum/Ascending colon: normal    Transverse colon: normal    Descending/Sigmoid colon: normal    Rectum/Anus: examined in normal and retroflexed positions and was abnormal: Rectosigmoid polyp removed with cold snare polypectomy technique retrieved and sent to pathology. Hypertrophied anal papilla. Withdrawal Time was (minutes): 20      Next screening colonoscopy: 5 years. If screening is less than 10 years the recommended reason is due:polyps    The colon was decompressed.   While withdrawing the scope the above findings were verified and the scope was removed. The patient tolerated the procedure and conscious sedation without unusual events. In the recovery room patient was examined and remains hemodynamically stable. Discharge home when criteria met. Recommendations/Plan:   1. F/U Biopsies  2. F/U In Office as instructed  3. Discussed with the family  4. High fiber diet   5.  Precautions to avoid constipation    Electronically signed by Nain Mendez MD  on 12/3/2021 at 12:42 PM

## 2021-12-03 NOTE — ANESTHESIA PRE PROCEDURE
Department of Anesthesiology  Preprocedure Note       Name:  Lili Lindsay   Age:  49 y.o.  :  1970                                          MRN:  [id number]         Date:  12/3/2021      Surgeon: Breana Govea):  Wilhemina Olszewski, MD    Procedure: Procedure(s):  COLORECTAL CANCER SCREENING, NOT HIGH RISK    Medications prior to admission:   Prior to Admission medications    Medication Sig Start Date End Date Taking? Authorizing Provider   acetaminophen (TYLENOL) 500 MG tablet Take 500 mg by mouth every 6 hours as needed for Pain    Historical Provider, MD   ibuprofen (ADVIL;MOTRIN) 400 MG tablet Take 400 mg by mouth every 6 hours as needed for Pain    Historical Provider, MD   cephALEXin (KEFLEX) 500 MG capsule 500 mgTake three times daily 3/22/19   Wilhemina Olszewski, MD   ondansetron (ZOFRAN) 4 MG tablet Take every six hours as needed 3/22/19   Wilhemina Olszewski, MD       Current medications:    Current Facility-Administered Medications   Medication Dose Route Frequency Provider Last Rate Last Admin    lactated ringers infusion   IntraVENous Continuous Rosa Mccoy MD        sodium chloride flush 0.9 % injection 5-40 mL  5-40 mL IntraVENous 2 times per day Rosa Mccoy MD        sodium chloride flush 0.9 % injection 5-40 mL  5-40 mL IntraVENous PRN Rosa Mccoy MD        0.9 % sodium chloride infusion  25 mL IntraVENous PRN Rosa Mccoy MD        lidocaine PF 1 % injection 1 mL  1 mL IntraDERmal Once PRN Rosa Mccoy MD           Allergies: Allergies   Allergen Reactions    Codeine     Percocet [Oxycodone-Acetaminophen]        Problem List:  There is no problem list on file for this patient.       Past Medical History:        Diagnosis Date    Umbilical hernia        Past Surgical History:        Procedure Laterality Date    ANTERIOR CRUCIATE LIGAMENT REPAIR Right     CERVICAL DISCECTOMY      x 2 (replaced with cadaver)    TONSILLECTOMY      UMBILICAL HERNIA REPAIR N/A 63/40/1470    HERNIA UMBILICAL REPAIR - WITH MESH performed by Tristian Haro MD at [address] [place] [address] History:    Social History     Tobacco Use    Smoking status: Current Every Day Smoker     Packs/day: 1.00    Smokeless tobacco: Never Used   Substance Use Topics    Alcohol use: Yes     Comment: socially                                Ready to quit: Not Answered  Counseling given: Not Answered      Vital Signs (Current): There were no vitals filed for this visit. BP Readings from Last 3 Encounters:   03/22/19 119/66   03/22/19 112/67       NPO Status:                                                                                 BMI:   Wt Readings from Last 3 Encounters:   11/22/21 195 lb (88.5 kg)   03/22/19 205 lb (93 kg)   03/19/19 205 lb (93 kg)     There is no height or weight on file to calculate BMI.    CBC:   Lab Results   Component Value Date    WBC 7.1 03/22/2019    RBC 4.51 03/22/2019    HGB 14.6 03/22/2019    HCT 42.1 03/22/2019    MCV 93.3 03/22/2019    RDW 12.4 03/22/2019     03/22/2019       CMP: No results found for: NA, K, CL, CO2, BUN, CREATININE, GFRAA, AGRATIO, LABGLOM, GLUCOSE, PROT, CALCIUM, BILITOT, ALKPHOS, AST, ALT    POC Tests: No results for input(s): POCGLU, POCNA, POCK, POCCL, POCBUN, POCHEMO, POCHCT in the last 72 hours.     Coags: No results found for: PROTIME, INR, APTT    HCG (If Applicable): No results found for: PREGTESTUR, PREGSERUM, HCG, HCGQUANT     ABGs: No results found for: PHART, PO2ART, XUP3JSN, TUX2CLA, BEART, I1PSBOOY     Type & Screen (If Applicable):  No results found for: LABABO, LABRH    Drug/Infectious Status (If Applicable):  No results found for: HIV, HEPCAB    COVID-19 Screening (If Applicable): No results found for: COVID19        Anesthesia Evaluation  Patient summary reviewed and Nursing notes reviewed no history of anesthetic complications:   Airway: Mallampati: II  TM distance: >3 FB   Neck ROM: full  Mouth opening: > = 3 FB Dental: normal exam         Pulmonary:normal exam  breath sounds clear to auscultation  (+) current smoker                           Cardiovascular:Negative CV ROS            Rhythm: regular  Rate: normal                    Neuro/Psych:   Negative Neuro/Psych ROS              GI/Hepatic/Renal: Neg GI/Hepatic/Renal ROS            Endo/Other: Negative Endo/Other ROS                    Abdominal:             Vascular: negative vascular ROS. Other Findings:             Anesthesia Plan      general     ASA 2       Induction: intravenous. Anesthetic plan and risks discussed with patient. Plan discussed with CRNA.                   Luis Manuel Valerio MD   12/3/2021

## 2021-12-07 LAB — SURGICAL PATHOLOGY REPORT: NORMAL

## 2023-10-30 ENCOUNTER — OFFICE VISIT (OUTPATIENT)
Age: 53
End: 2023-10-30
Payer: COMMERCIAL

## 2023-10-30 VITALS
HEIGHT: 68 IN | SYSTOLIC BLOOD PRESSURE: 122 MMHG | WEIGHT: 199.9 LBS | HEART RATE: 78 BPM | BODY MASS INDEX: 30.3 KG/M2 | DIASTOLIC BLOOD PRESSURE: 68 MMHG

## 2023-10-30 DIAGNOSIS — M54.50 LUMBAR PAIN: ICD-10-CM

## 2023-10-30 DIAGNOSIS — M54.16 LUMBAR RADICULOPATHY: Primary | ICD-10-CM

## 2023-10-30 PROCEDURE — 99203 OFFICE O/P NEW LOW 30 MIN: CPT | Performed by: PHYSICIAN ASSISTANT

## 2023-10-30 ASSESSMENT — ENCOUNTER SYMPTOMS
COUGH: 0
CONSTIPATION: 0
ABDOMINAL PAIN: 0
SHORTNESS OF BREATH: 0
WHEEZING: 0
VOMITING: 0
NAUSEA: 0
BACK PAIN: 1

## 2023-10-30 NOTE — PROGRESS NOTES
[address] [place] [address] Dr Randy [place] [address]  Dept: [phone]  Dept Fax: [phone]     Patient:  Hortencia Guallpa  YOB: 1970  Date: 10/30/23      Chief Complaint   Patient presents with    New Patient     F/u lumbar pain numbness and tingling lower extremities            HPI:     I had the pleasure of seeing this patient in the office today for primary complaints of back right buttock and hip and right lower extremity discomfort. As you know he is a 60-year-old male who presents today with a  6-month history of ongoing discomfort. He states he had returned back to work in April following a knee replacement when he began to notice pain in the lower back and buttock and hips. Having pain diffusely throughout the lower back which courses into the right buttock and hip and into the right lower extremity ending in his right foot. Denies pain in the left lower extremity. He has seen a chiropractor and has been through physical therapy for this discomfort which is led to no significant improvement. History:     Past Medical History:   Diagnosis Date    Umbilical hernia      Past Surgical History:   Procedure Laterality Date    ANTERIOR CRUCIATE LIGAMENT REPAIR Right 1997    CERVICAL DISCECTOMY      x 2 (replaced with cadaver)    COLONOSCOPY N/A 12/3/2021    COLONOSCOPY POLYPECTOMY SNARE/COLD BIOPSY performed by Regina Romero MD at [address] [facility] N/A 20/58/8489    HERNIA UMBILICAL REPAIR - WITH MESH performed by Regina Romero MD at [address]     History reviewed. No pertinent family history.   Current Outpatient Medications on File Prior to Visit   Medication Sig Dispense Refill    acetaminophen (TYLENOL) 500 MG tablet Take 1 tablet by mouth every 6 hours as needed for Pain      ibuprofen (ADVIL;MOTRIN) 400 MG tablet Take 1 tablet by

## 2023-10-30 NOTE — PROGRESS NOTES
Review of Systems   Constitutional:  Negative for chills, fatigue, fever and unexpected weight change. Respiratory:  Negative for cough, shortness of breath and wheezing. Cardiovascular:  Negative for chest pain and palpitations. Gastrointestinal:  Negative for abdominal pain, constipation, nausea and vomiting. Musculoskeletal:  Positive for back pain and gait problem. Negative for joint swelling, neck pain and neck stiffness. Neurological:  Positive for numbness. Negative for dizziness and headaches. Psychiatric/Behavioral:  Positive for sleep disturbance.

## 2023-11-06 ENCOUNTER — OFFICE VISIT (OUTPATIENT)
Age: 53
End: 2023-11-06
Payer: COMMERCIAL

## 2023-11-06 DIAGNOSIS — M54.16 LUMBAR RADICULOPATHY: ICD-10-CM

## 2023-11-06 DIAGNOSIS — M51.26 LUMBAR DISC HERNIATION: Primary | ICD-10-CM

## 2023-11-06 DIAGNOSIS — M48.061 STENOSIS OF LATERAL RECESS OF LUMBAR SPINE: ICD-10-CM

## 2023-11-06 PROCEDURE — 99213 OFFICE O/P EST LOW 20 MIN: CPT | Performed by: PHYSICIAN ASSISTANT

## 2023-11-06 NOTE — PROGRESS NOTES
effort was made to ensure the accuracy of this automated transcription, some errors in transcription may have occurred.

## 2023-12-21 ENCOUNTER — HOSPITAL ENCOUNTER (OUTPATIENT)
Dept: PREADMISSION TESTING | Age: 53
Discharge: HOME | End: 2023-12-25
Payer: COMMERCIAL

## 2023-12-21 VITALS
WEIGHT: 200.1 LBS | OXYGEN SATURATION: 100 % | HEIGHT: 68 IN | SYSTOLIC BLOOD PRESSURE: 142 MMHG | RESPIRATION RATE: 20 BRPM | TEMPERATURE: 97.1 F | HEART RATE: 71 BPM | BODY MASS INDEX: 30.33 KG/M2 | DIASTOLIC BLOOD PRESSURE: 76 MMHG

## 2023-12-21 LAB
EKG ATRIAL RATE: 65 BPM
EKG P AXIS: 74 DEGREES
EKG P-R INTERVAL: 154 MS
EKG Q-T INTERVAL: 412 MS
EKG QRS DURATION: 82 MS
EKG QTC CALCULATION (BAZETT): 428 MS
EKG R AXIS: -53 DEGREES
EKG T AXIS: 10 DEGREES
EKG VENTRICULAR RATE: 65 BPM

## 2023-12-21 PROCEDURE — 93005 ELECTROCARDIOGRAM TRACING: CPT | Performed by: ANESTHESIOLOGY

## 2023-12-21 NOTE — PRE-PROCEDURE INSTRUCTIONS
On the Day of Your Surgery, Thursday, 01/11/2024, Please Arrive At 0945 AM     Enter the hospital through the Main Entrance, take the lobby elevators to the second floor and check in at the Surgery Registration desk. Continue to take your home medications as you normally do up to and including the night before surgery with the exception of blood thinning medications. Blood Thinning Medications:  Please stop prescription blood thinning medications such as Apixaban (Eliquis); Clopidogrel (Plavix); Dabigatran (Pradaxa); Prasugrel (Effient); Rivaroxaban (Xarelto); Ticagrelor (Brilinta); Warfarin (Coumadin) only as directed by your surgeon and/or the prescribing physician    Some common examples of other medications that can thin your blood are: Aspirin, Ibuprofen (Advil, Motrin), Naproxen (Aleve), Meloxicam (Mobic), Celecoxib (Celebrex), Fish Oil, many Herbal Supplements. These medications should usually be stopped at least 7 days prior to surgery. Ibuprofen    Tylenol is OK to take for pain the week prior to surgery. Failure to stop certain medications may interfere with your scheduled surgery. If you receive instructions from your surgeon regarding what medications to stop prior to surgery, please follow those specific instructions. If You Have Diabetes:  Do not take any of your diabetic medications, (injectables or by mouth) the morning of surgery unless otherwise instructed by the doctor who manages your diabetes. If you are taking insulin, contact the doctor the manages your diabetes for instructions about any changes to your insulin dosages the day before surgery. Please take the following medication(s) the day of surgery with small sips of water:              none    Please use your inhaler(s) if needed and bring your inhaler(s) from home the day of surgery. Showering Before Surgery: You can play an important role in your own health by carefully washing before surgery.  Shower the patient at [facility] you can expect quality medical and nursing care that is centered on you individual needs. Our goal is to make your surgical experience as comfortable as possible.     Any questions about preparing for your surgery please call [phone].      ____________________________   ____________________________  Signature (Patient)                                 Signature (Nurse)                     Date

## 2023-12-22 NOTE — FLOWSHEET NOTE
12/22/23 1151   Anesthesia PAT Clearance   Anesthesia Review Status Anes has reviewed patient for surgery   Is the patient cleared? Patient is cleared for surgery  (Dr. Marcos Perez reviewed EKG and medical history.  No further intervention needed at this time.)

## 2024-01-10 ENCOUNTER — ANESTHESIA EVENT (OUTPATIENT)
Dept: OPERATING ROOM | Age: 54
End: 2024-01-10
Payer: COMMERCIAL

## 2024-01-11 ENCOUNTER — APPOINTMENT (OUTPATIENT)
Dept: GENERAL RADIOLOGY | Age: 54
End: 2024-01-11
Attending: NEUROLOGICAL SURGERY
Payer: COMMERCIAL

## 2024-01-11 ENCOUNTER — HOSPITAL ENCOUNTER (OUTPATIENT)
Age: 54
Setting detail: OUTPATIENT SURGERY
Discharge: HOME | End: 2024-01-11
Attending: NEUROLOGICAL SURGERY | Admitting: NEUROLOGICAL SURGERY
Payer: COMMERCIAL

## 2024-01-11 ENCOUNTER — ANESTHESIA (OUTPATIENT)
Dept: OPERATING ROOM | Age: 54
End: 2024-01-11
Payer: COMMERCIAL

## 2024-01-11 VITALS
RESPIRATION RATE: 12 BRPM | BODY MASS INDEX: 30.31 KG/M2 | TEMPERATURE: 98.1 F | HEIGHT: 68 IN | HEART RATE: 69 BPM | OXYGEN SATURATION: 98 % | WEIGHT: 200 LBS | SYSTOLIC BLOOD PRESSURE: 129 MMHG | DIASTOLIC BLOOD PRESSURE: 85 MMHG

## 2024-01-11 DIAGNOSIS — M51.26 HERNIATED LUMBAR DISC WITHOUT MYELOPATHY: Primary | ICD-10-CM

## 2024-01-11 PROCEDURE — 2500000003 HC RX 250 WO HCPCS: Performed by: NEUROLOGICAL SURGERY

## 2024-01-11 PROCEDURE — 3700000001 HC ADD 15 MINUTES (ANESTHESIA): Performed by: NEUROLOGICAL SURGERY

## 2024-01-11 PROCEDURE — 6360000002 HC RX W HCPCS: Performed by: NEUROLOGICAL SURGERY

## 2024-01-11 PROCEDURE — 6370000000 HC RX 637 (ALT 250 FOR IP): Performed by: NEUROLOGICAL SURGERY

## 2024-01-11 PROCEDURE — C1713 ANCHOR/SCREW BN/BN,TIS/BN: HCPCS | Performed by: NEUROLOGICAL SURGERY

## 2024-01-11 PROCEDURE — 7100000010 HC PHASE II RECOVERY - FIRST 15 MIN: Performed by: NEUROLOGICAL SURGERY

## 2024-01-11 PROCEDURE — 3600000002 HC SURGERY LEVEL 2 BASE: Performed by: NEUROLOGICAL SURGERY

## 2024-01-11 PROCEDURE — 3700000000 HC ANESTHESIA ATTENDED CARE: Performed by: NEUROLOGICAL SURGERY

## 2024-01-11 PROCEDURE — 2580000003 HC RX 258: Performed by: ANESTHESIOLOGY

## 2024-01-11 PROCEDURE — 7100000000 HC PACU RECOVERY - FIRST 15 MIN: Performed by: NEUROLOGICAL SURGERY

## 2024-01-11 PROCEDURE — 7100000011 HC PHASE II RECOVERY - ADDTL 15 MIN: Performed by: NEUROLOGICAL SURGERY

## 2024-01-11 PROCEDURE — 7100000001 HC PACU RECOVERY - ADDTL 15 MIN: Performed by: NEUROLOGICAL SURGERY

## 2024-01-11 PROCEDURE — 6360000002 HC RX W HCPCS: Performed by: NURSE ANESTHETIST, CERTIFIED REGISTERED

## 2024-01-11 PROCEDURE — 6360000002 HC RX W HCPCS: Performed by: ANESTHESIOLOGY

## 2024-01-11 PROCEDURE — 3600000012 HC SURGERY LEVEL 2 ADDTL 15MIN: Performed by: NEUROLOGICAL SURGERY

## 2024-01-11 PROCEDURE — 2720000010 HC SURG SUPPLY STERILE: Performed by: NEUROLOGICAL SURGERY

## 2024-01-11 PROCEDURE — 2709999900 HC NON-CHARGEABLE SUPPLY: Performed by: NEUROLOGICAL SURGERY

## 2024-01-11 PROCEDURE — A4217 STERILE WATER/SALINE, 500 ML: HCPCS | Performed by: NEUROLOGICAL SURGERY

## 2024-01-11 PROCEDURE — 2580000003 HC RX 258: Performed by: NEUROLOGICAL SURGERY

## 2024-01-11 PROCEDURE — 2500000003 HC RX 250 WO HCPCS: Performed by: NURSE ANESTHETIST, CERTIFIED REGISTERED

## 2024-01-11 RX ORDER — ONDANSETRON 2 MG/ML
INJECTION INTRAMUSCULAR; INTRAVENOUS PRN
Status: DISCONTINUED | OUTPATIENT
Start: 2024-01-11 | End: 2024-01-11 | Stop reason: SDUPTHER

## 2024-01-11 RX ORDER — LIDOCAINE HYDROCHLORIDE 10 MG/ML
1 INJECTION, SOLUTION EPIDURAL; INFILTRATION; INTRACAUDAL; PERINEURAL
Status: DISCONTINUED | OUTPATIENT
Start: 2024-01-11 | End: 2024-01-11 | Stop reason: HOSPADM

## 2024-01-11 RX ORDER — ROCURONIUM BROMIDE 10 MG/ML
INJECTION, SOLUTION INTRAVENOUS PRN
Status: DISCONTINUED | OUTPATIENT
Start: 2024-01-11 | End: 2024-01-11 | Stop reason: SDUPTHER

## 2024-01-11 RX ORDER — SODIUM CHLORIDE 9 MG/ML
INJECTION, SOLUTION INTRAVENOUS PRN
Status: DISCONTINUED | OUTPATIENT
Start: 2024-01-11 | End: 2024-01-11 | Stop reason: HOSPADM

## 2024-01-11 RX ORDER — FENTANYL CITRATE 50 UG/ML
25 INJECTION, SOLUTION INTRAMUSCULAR; INTRAVENOUS EVERY 5 MIN PRN
Status: DISCONTINUED | OUTPATIENT
Start: 2024-01-11 | End: 2024-01-11 | Stop reason: HOSPADM

## 2024-01-11 RX ORDER — MIDAZOLAM HYDROCHLORIDE 1 MG/ML
INJECTION INTRAMUSCULAR; INTRAVENOUS PRN
Status: DISCONTINUED | OUTPATIENT
Start: 2024-01-11 | End: 2024-01-11 | Stop reason: SDUPTHER

## 2024-01-11 RX ORDER — SODIUM CHLORIDE 9 MG/ML
INJECTION, SOLUTION INTRAVENOUS CONTINUOUS
Status: DISCONTINUED | OUTPATIENT
Start: 2024-01-11 | End: 2024-01-11 | Stop reason: HOSPADM

## 2024-01-11 RX ORDER — FENTANYL CITRATE 50 UG/ML
INJECTION, SOLUTION INTRAMUSCULAR; INTRAVENOUS PRN
Status: DISCONTINUED | OUTPATIENT
Start: 2024-01-11 | End: 2024-01-11 | Stop reason: SDUPTHER

## 2024-01-11 RX ORDER — SODIUM CHLORIDE 0.9 % (FLUSH) 0.9 %
5-40 SYRINGE (ML) INJECTION PRN
Status: DISCONTINUED | OUTPATIENT
Start: 2024-01-11 | End: 2024-01-11 | Stop reason: HOSPADM

## 2024-01-11 RX ORDER — PROPOFOL 10 MG/ML
INJECTION, EMULSION INTRAVENOUS PRN
Status: DISCONTINUED | OUTPATIENT
Start: 2024-01-11 | End: 2024-01-11 | Stop reason: SDUPTHER

## 2024-01-11 RX ORDER — TIZANIDINE 2 MG/1
2 TABLET ORAL 3 TIMES DAILY PRN
Qty: 40 TABLET | Refills: 0 | Status: SHIPPED | OUTPATIENT
Start: 2024-01-11 | End: 2024-01-26

## 2024-01-11 RX ORDER — SODIUM CHLORIDE 0.9 % (FLUSH) 0.9 %
5-40 SYRINGE (ML) INJECTION EVERY 12 HOURS SCHEDULED
Status: DISCONTINUED | OUTPATIENT
Start: 2024-01-11 | End: 2024-01-11 | Stop reason: HOSPADM

## 2024-01-11 RX ORDER — LIDOCAINE HYDROCHLORIDE 20 MG/ML
INJECTION, SOLUTION EPIDURAL; INFILTRATION; INTRACAUDAL; PERINEURAL PRN
Status: DISCONTINUED | OUTPATIENT
Start: 2024-01-11 | End: 2024-01-11 | Stop reason: SDUPTHER

## 2024-01-11 RX ORDER — HYDROCODONE BITARTRATE AND ACETAMINOPHEN 5; 325 MG/1; MG/1
1 TABLET ORAL EVERY 4 HOURS PRN
Qty: 42 TABLET | Refills: 0 | Status: SHIPPED | OUTPATIENT
Start: 2024-01-11 | End: 2024-01-18

## 2024-01-11 RX ORDER — FENTANYL CITRATE 50 UG/ML
50 INJECTION, SOLUTION INTRAMUSCULAR; INTRAVENOUS EVERY 10 MIN PRN
Status: DISCONTINUED | OUTPATIENT
Start: 2024-01-11 | End: 2024-01-11 | Stop reason: HOSPADM

## 2024-01-11 RX ORDER — DEXAMETHASONE SODIUM PHOSPHATE 10 MG/ML
INJECTION, SOLUTION INTRAMUSCULAR; INTRAVENOUS PRN
Status: DISCONTINUED | OUTPATIENT
Start: 2024-01-11 | End: 2024-01-11 | Stop reason: SDUPTHER

## 2024-01-11 RX ORDER — SODIUM CHLORIDE, SODIUM LACTATE, POTASSIUM CHLORIDE, CALCIUM CHLORIDE 600; 310; 30; 20 MG/100ML; MG/100ML; MG/100ML; MG/100ML
INJECTION, SOLUTION INTRAVENOUS CONTINUOUS
Status: DISCONTINUED | OUTPATIENT
Start: 2024-01-11 | End: 2024-01-11 | Stop reason: HOSPADM

## 2024-01-11 RX ORDER — ONDANSETRON 2 MG/ML
4 INJECTION INTRAMUSCULAR; INTRAVENOUS
Status: DISCONTINUED | OUTPATIENT
Start: 2024-01-11 | End: 2024-01-11 | Stop reason: HOSPADM

## 2024-01-11 RX ADMIN — FENTANYL CITRATE 50 MCG: 50 INJECTION INTRAMUSCULAR; INTRAVENOUS at 09:34

## 2024-01-11 RX ADMIN — PROPOFOL 200 MG: 10 INJECTION, EMULSION INTRAVENOUS at 07:35

## 2024-01-11 RX ADMIN — DEXAMETHASONE SODIUM PHOSPHATE 10 MG: 10 INJECTION, SOLUTION INTRAMUSCULAR; INTRAVENOUS at 07:44

## 2024-01-11 RX ADMIN — SODIUM CHLORIDE, POTASSIUM CHLORIDE, SODIUM LACTATE AND CALCIUM CHLORIDE: 600; 310; 30; 20 INJECTION, SOLUTION INTRAVENOUS at 06:33

## 2024-01-11 RX ADMIN — LIDOCAINE HYDROCHLORIDE 80 MG: 20 INJECTION, SOLUTION EPIDURAL; INFILTRATION; INTRACAUDAL; PERINEURAL at 07:35

## 2024-01-11 RX ADMIN — SODIUM CHLORIDE, POTASSIUM CHLORIDE, SODIUM LACTATE AND CALCIUM CHLORIDE: 600; 310; 30; 20 INJECTION, SOLUTION INTRAVENOUS at 08:34

## 2024-01-11 RX ADMIN — FENTANYL CITRATE 50 MCG: 50 INJECTION INTRAMUSCULAR; INTRAVENOUS at 09:45

## 2024-01-11 RX ADMIN — FENTANYL CITRATE 100 MCG: 50 INJECTION, SOLUTION INTRAMUSCULAR; INTRAVENOUS at 07:35

## 2024-01-11 RX ADMIN — ONDANSETRON 4 MG: 2 INJECTION INTRAMUSCULAR; INTRAVENOUS at 08:46

## 2024-01-11 RX ADMIN — MIDAZOLAM 2 MG: 1 INJECTION INTRAMUSCULAR; INTRAVENOUS at 07:30

## 2024-01-11 RX ADMIN — ROCURONIUM BROMIDE 50 MG: 10 INJECTION, SOLUTION INTRAVENOUS at 07:35

## 2024-01-11 RX ADMIN — Medication 2000 MG: at 07:47

## 2024-01-11 RX ADMIN — FENTANYL CITRATE 50 MCG: 50 INJECTION, SOLUTION INTRAMUSCULAR; INTRAVENOUS at 08:23

## 2024-01-11 ASSESSMENT — PAIN SCALES - GENERAL
PAINLEVEL_OUTOF10: 4
PAINLEVEL_OUTOF10: 6
PAINLEVEL_OUTOF10: 4
PAINLEVEL_OUTOF10: 4
PAINLEVEL_OUTOF10: 7
PAINLEVEL_OUTOF10: 7
PAINLEVEL_OUTOF10: 4

## 2024-01-11 ASSESSMENT — PAIN DESCRIPTION - LOCATION
LOCATION: BACK

## 2024-01-11 ASSESSMENT — PAIN DESCRIPTION - DESCRIPTORS
DESCRIPTORS: ACHING;NUMBNESS
DESCRIPTORS: ACHING

## 2024-01-11 ASSESSMENT — PAIN - FUNCTIONAL ASSESSMENT
PAIN_FUNCTIONAL_ASSESSMENT: PREVENTS OR INTERFERES SOME ACTIVE ACTIVITIES AND ADLS
PAIN_FUNCTIONAL_ASSESSMENT: 0-10
PAIN_FUNCTIONAL_ASSESSMENT: FACE, LEGS, ACTIVITY, CRY, AND CONSOLABILITY (FLACC)

## 2024-01-11 ASSESSMENT — LIFESTYLE VARIABLES: SMOKING_STATUS: 1

## 2024-01-11 ASSESSMENT — ENCOUNTER SYMPTOMS: SHORTNESS OF BREATH: 0

## 2024-01-11 NOTE — OP NOTE
Operative Note      Patient: Sacha Heart  YOB: 1970  MRN: [id number]    Date of Procedure: 1/11/2024    Pre-Op Diagnosis Codes:     * Lumbar herniated disc [M51.26]    Post-Op Diagnosis: Same       Procedure(s):  right L4-5 LUMBAR LAMINECTOMY WITH C ARM    Surgeon(s):  Riccardo Montesinos MD    Assistant:   Physician Assistant: Dottie Vargas PA    Anesthesia: General    Estimated Blood Loss (mL): Minimal    Complications: None    Specimens:   * No specimens in log *    Implants:  * No implants in log *      Drains: * No LDAs found *    Findings: Partially calcified herniated disc with nerve root impingement        Detailed Description of Procedure:   Patient is a 53-year-old male who presents with a 6-month history of ongoing back as well as right lower extremity discomfort consistent with ongoing lumbar radiculopathy.  Radiographic imaging in the form of a MRI lumbar spine demonstrates a very large right paracentral disc protrusion resulting in severe central spinal stenosis with impingement to the exiting L5 nerve root on the right.  Further treatment options were discussed, conservative measures versus surgical intervention.  After reviewing the radiographic image as well as discussed the details and risk of surgery, the patient wished for going further conservative measures in favor surgical intervention.  Thus he was scheduled for surgery as outlined below.    The patient was transported from the preanesthesia area to the operating room.  After the adequate induction of general tracheal anesthesia, the patient was placed onto the operating table in the prone position onto a Scott frame.  The appropriate pressure points about the body were carefully padded.  Preoperative IV antibiotics were given.  The region over lower back was then prepped and draped in usual sterile fashion.  The incision line was infiltrated with local anesthetic.  At this point a linear incision was made in the midline

## 2024-01-11 NOTE — ANESTHESIA POSTPROCEDURE EVALUATION
Department of Anesthesiology  Postprocedure Note    Patient: Sacha Heart  MRN: [id number]  YOB: 1970  Date of evaluation: 1/11/2024    Procedure Summary       Date: 01/11/24 Room / Location: [address]    Anesthesia Start: 0730 Anesthesia Stop: 0910    Procedure: right L4-5 LUMBAR LAMINECTOMY WITH C ARM (Right) Diagnosis:       Lumbar herniated disc      (Lumbar herniated disc [M51.26])    Surgeons: Riccardo Montesinos MD Responsible Provider: Arturo Cole MD    Anesthesia Type: general ASA Status: 3            Anesthesia Type: No value filed.    Katie Phase I: Katie Score: 10    Katie Phase II: Katie Score: 10    Anesthesia Post Evaluation    Cardiovascular status: hemodynamically stable    No notable events documented.

## 2024-01-11 NOTE — DISCHARGE INSTRUCTIONS
No driving for one to two weeks after surgery.   No lifting greater than 10 pounds for the first month.   Keep dressing dry and in place until shower on Monday 1-  Remove dressing after shower your shower on Monday  You may resume your Motrin 5 days after surgery    Keep it Clean - Post-Operative Home instructions    These instructions are to help you have the best possible recovery after your surgical procedure. [facility] is here to support you. If you have questions, call [phone] Monday through Friday from 7:30AM to 8:30PM to speak to a nurse. If you need to speak to someone outside of these hours, call your physician.     Incision Do’s and Don’ts  Do wash hands before and after dressing changes or when you have had any contact with your incision. Use hand  or antibacterial soap.  Do keep your incision clean and dry. It’s OK to wash the skin around your incision with mild soap and water.  Do change your dressing as you were told.   Do notify your doctor if the dressing becomes wet or dirty.  Do use a clean washcloth every time when cleaning your incision.   Do sleep on clean linens.  Do keep pets away from incision site.  Don’t sit in a bathtub, pool, or hot tub until your incision is fully closed and any drains are removed.  Don’t scrub, pick, scratch, or pull at your incision.  Don’t use oils, lotions, or creams on your incision unless your healthcare provider approves it.    Follow-up  You will have one or more follow-up visits with your healthcare provider. These are needed to check how well you’re healing. Your drain, stitches, or staples may also be removed during these visits. Do not miss your follow-up visit, even if you are feeling better.      Call your healthcare provider right away if you have the following:  Fever of 100.4°F (38°C) or higher, or as advised by your healthcare provider.  Chest pain or trouble breathing.  Pain or tenderness in your leg(s).  Increased pain, redness,

## 2024-01-11 NOTE — H&P
History and Physical Service   [facility]    HISTORY AND PHYSICAL EXAMINATION            Date of Evaluation: 1/11/2024  Patient name:  Sacha Heart  MRN:   [id number]  YOB: 1970  PCP:    Madhu Alaniz MD    History Obtained From:     Patient, medical records    History of Present Illness:     This is Sacha Heart a 53 y.o. male who presents today for a right L4-5 LUMBAR LAMINECTOMY by Riccardo Montesinos MD for Lumbar herniated disc. The patient's chief complaint is lower back pain and RLE pain, numbness, and tingling that has been progressively worsening over the last year. Back and RLE pain is aggravated by laying flat, going from sitting to standing and minimally improved with laying on his side with his leg up. Prior treatments include physical therapy (following knee surgery), chiropractic treatments. Denies fever, chills, shortness of breath, cough, congestion, wheezing, chest pain, open sores or wounds. Denies hx of diabetes. Last Ibuprofen more than a week ago.    Past Medical History:     Past Medical History:   Diagnosis Date    Arthritis     Hyperlipidemia     Umbilical hernia         Past Surgical History:     Past Surgical History:   Procedure Laterality Date    ANTERIOR CRUCIATE LIGAMENT REPAIR Right 1997    CERVICAL DISCECTOMY      x 2 (replaced with cadaver)    COLONOSCOPY N/A 12/03/2021    COLONOSCOPY POLYPECTOMY SNARE/COLD BIOPSY performed by Haile Barnett MD at [facility] ENDO    JOINT REPLACEMENT Right     knee    NECK SURGERY      plate and screws in place    UMBILICAL HERNIA REPAIR N/A 03/22/2019    HERNIA UMBILICAL REPAIR - WITH MESH performed by Haile Barnett MD at [facility] OR        Medications Prior to Admission:     Prior to Admission medications    Medication Sig Start Date End Date Taking? Authorizing Provider   acetaminophen (TYLENOL) 500 MG tablet Take 1 tablet by mouth every 6 hours as needed for Pain    Provider, MD Ami   ibuprofen

## 2024-01-11 NOTE — PROGRESS NOTES
PATIENT STATES PAIN RIGHT HIP AND DOWN RIGHT LEG, INTERMITTENT NUMBNESS AND TINGLING RIGHT LEG AND FOOT, BILATERAL HAND GRASPS AND D/P FLEXION OF FEET STRONG AND EQUAL.

## 2024-01-11 NOTE — ANESTHESIA PRE PROCEDURE
Department of Anesthesiology  Preprocedure Note       Name:  Sacha Heart   Age:  53 y.o.  :  1970                                          MRN:  [id number]         Date:  2024      Surgeon: Surgeon(s):  Riccardo Montesinos MD    Procedure: Procedure(s):  right L4-5 LUMBAR LAMINECTOMY    Medications prior to admission:   Prior to Admission medications    Medication Sig Start Date End Date Taking? Authorizing Provider   acetaminophen (TYLENOL) 500 MG tablet Take 1 tablet by mouth every 6 hours as needed for Pain    ProviderAmi MD   ibuprofen (ADVIL;MOTRIN) 400 MG tablet Take 1 tablet by mouth every 6 hours as needed for Pain    Provider, MD Ami       Current medications:    Current Facility-Administered Medications   Medication Dose Route Frequency Provider Last Rate Last Admin   • lidocaine PF 1 % injection 1 mL  1 mL IntraDERmal Once PRN Davina Barillas MD       • 0.9 % sodium chloride infusion   IntraVENous Continuous Davina Barillas MD       • lactated ringers IV soln infusion   IntraVENous Continuous Davina Barillas  mL/hr at 24 0633 New Bag at 24 0633   • sodium chloride flush 0.9 % injection 5-40 mL  5-40 mL IntraVENous 2 times per day Davina Barillas MD       • sodium chloride flush 0.9 % injection 5-40 mL  5-40 mL IntraVENous PRN Davina Barillas MD       • 0.9 % sodium chloride infusion   IntraVENous PRN Davina Barillas MD       • ceFAZolin (ANCEF) 2000 mg in 0.9% sodium chloride 50 mL IVPB  2,000 mg IntraVENous Once Riccardo Montesinos MD           Allergies:    Allergies   Allergen Reactions   • Codeine    • Percocet [Oxycodone-Acetaminophen]    • Dilaudid [Hydromorphone] Nausea And Vomiting       Problem List:  There is no problem list on file for this patient.      Past Medical History:        Diagnosis Date   • Arthritis    • Hyperlipidemia    • Umbilical hernia        Past Surgical History:        Procedure Laterality Date   • ANTERIOR CRUCIATE LIGAMENT REPAIR

## 2024-02-07 ENCOUNTER — OFFICE VISIT (OUTPATIENT)
Age: 54
End: 2024-02-07

## 2024-02-07 VITALS — SYSTOLIC BLOOD PRESSURE: 110 MMHG | HEART RATE: 82 BPM | DIASTOLIC BLOOD PRESSURE: 73 MMHG

## 2024-02-07 DIAGNOSIS — M51.9 LUMBAR DISC DISEASE: Primary | ICD-10-CM

## 2024-02-07 PROCEDURE — 99024 POSTOP FOLLOW-UP VISIT: CPT | Performed by: NEUROLOGICAL SURGERY

## 2024-02-07 NOTE — PROGRESS NOTES
[facility] NEUROSCIENCE [place], [facility] NEUROSURGERY  [address], [address]  [place] [facility] [place] [address]  Dept: [phone]  Dept Fax: [phone]     Patient:  Sacha Heart  YOB: 1970  Date: 2/7/24      Chief Complaint   Patient presents with    Post-Op Check     R L4-5 LAMI 1/11/24           HPI:     I had the pleasure of seeing this patient back in the office today in follow-up.  As you know the patient is a 53-year-old male who underwent a right L4-5 laminectomy for nerve root impingement approxi-1 month ago.  Postoperatively he is slowly improving.  He has noticed a marked reduction of his previous right lower extremity discomfort.  He does have ongoing back discomfort.  He has been slowly increasing his activity at home.    Examination today demonstrates his back incision to be healing well.  The incision is nonerythematous nontender.  He has good strength and sensation in both lower extremities.  Gait is steady.    Overall I think this patient is progressing well from his lower back surgery for nerve root impingement 1 month ago.  I took this opportunity to discuss remaining activity restrictions and limitations for the upcoming several months.  I am going to place him in the physical therapy including both water and land-based therapy given his ongoing complaints of lower back discomfort.  Additionally the patient's job does require extensive bending and lifting and thus I would like him to complete therapy prior to returning to work.  I took this opportunity to answer intermittent questions that he and his wife may have had.  I will plan on seeing him back in the office after he has completed his course of therapy.  In the meantime at invite him to feel free to contact me should he have further problems or questions which I could be of assistance with in the interim.        Physical Exam:      /73 (Site: Left Upper Arm,

## 2024-03-19 ENCOUNTER — TELEPHONE (OUTPATIENT)
Age: 54
End: 2024-03-19

## 2024-03-19 NOTE — TELEPHONE ENCOUNTER
Patient had Laminectomy 1/11/24. He is requesting to return to work on 3/25/24. Since he was last seen, he did complete PT (notes scanned into media),he also stated he accepted a new job that is not near as physical as his previous. 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 Please advise if okay to return and if you would like any remaining restrictions.     Per your previous note:  I am going to place him in the physical therapy including both water and land-based therapy given his ongoing complaints of lower back discomfort. Additionally the patient's job does require extensive bending and lifting and thus I would like him to complete therapy prior to returning to work.

## 2024-11-13 ENCOUNTER — OFFICE VISIT (OUTPATIENT)
Age: 54
End: 2024-11-13
Payer: COMMERCIAL

## 2024-11-13 VITALS
DIASTOLIC BLOOD PRESSURE: 61 MMHG | WEIGHT: 200 LBS | SYSTOLIC BLOOD PRESSURE: 107 MMHG | HEIGHT: 68 IN | RESPIRATION RATE: 16 BRPM | BODY MASS INDEX: 30.31 KG/M2 | HEART RATE: 78 BPM

## 2024-11-13 DIAGNOSIS — M54.50 LUMBAR PAIN: Primary | ICD-10-CM

## 2024-11-13 PROCEDURE — 99213 OFFICE O/P EST LOW 20 MIN: CPT | Performed by: NEUROLOGICAL SURGERY

## 2024-11-13 RX ORDER — METHYLPREDNISOLONE 4 MG/1
TABLET ORAL
Qty: 1 KIT | Refills: 0 | Status: SHIPPED | OUTPATIENT
Start: 2024-11-13

## 2024-11-13 NOTE — PROGRESS NOTES
[facility], [facility] NEUROSURGERY  [address], [address]  [place] [facility] [place] [address]  Dept: [phone]  Dept Fax: [phone]     Patient:  Sacha Heart  YOB: 1970  Date: 11/13/24      Chief Complaint   Patient presents with    Follow-up     back pain, left leg pain/numbness - lumbar sx on 1/11/24  Xray Lumbar 10/10/24 @ [facility]              HPI:     I had the pleasure of seeing this patient back in the office today in follow-up.  As you know he is a 54-year-old male who underwent a right sided L4-5 laminectomy for nerve root impingement approximately 10 months ago.  The patient indicates he was doing well until approximately 2 months ago when he developed recrudescence of discomfort in his low back.  The pain was located diffusely across the lower lumbar region.  He did not note any radicular pain coursing in the lower extremity.  The patient started medications including a muscle relaxant as well as nonsteroidal.  With time and medication indicates he feels 85% improved.  He has mild remaining discomfort in the lower back.    Examination today demonstrates his back incision to be flat, nonerythematous nontender.  He has no reproducible discomfort across the lumbar region including sacroiliac joints.  Hips are nontender.  Sushant's testing is negative.  Strength and sensation are normal throughout both lower extremities.  Straight leg raising is -9 degrees.  Gait is steady.    Recent lumbar x-rays performed October 10, 2024 reviewed.  These x-rays demonstrate early degenerative changes prickly the L5-S1 disc space level.  There is no evidence of an acute fracture or spine malalignment.  I did review these images in the office today with the patient and his wife.  At this point time his lower back discomfort is most consistent with an inflammatory or muscular strain type of disorder.  I am going to prescribe him a

## (undated) DEVICE — YANKAUER,FLEXIBLE HANDLE,REGLR CAPACITY: Brand: MEDLINE INDUSTRIES, INC.

## (undated) DEVICE — SOLUTION IV 250ML 0.9% SOD CHL PH 5 INJ USP VIAFLX PLAS

## (undated) DEVICE — GLOVE SURG SZ 7 L12IN FNGR THK79MIL GRN LTX FREE

## (undated) DEVICE — PAD,NON-ADHERENT,3X8,STERILE,LF,1/PK: Brand: MEDLINE

## (undated) DEVICE — SURGICAL SUCTION CONNECTING TUBE WITH MALE CONNECTOR AND SUCTION CLAMP, 2 FT. LONG (.6 M), 5 MM I.D.: Brand: CONMED

## (undated) DEVICE — GAUZE, BORDER, 3"X6", 1.5"X4"PAD, STERIL: Brand: MEDLINE INDUSTRIES, INC.

## (undated) DEVICE — SUTURE VCRL SZ 0 L18IN ABSRB UD L36MM CT-1 1/2 CIR J840D

## (undated) DEVICE — SYRINGE IRRIG 60ML SFT PLIABLE BLB EZ TO GRP 1 HND USE W/

## (undated) DEVICE — STRIP,CLOSURE,WOUND,MEDI-STRIP,1/2X4: Brand: MEDLINE

## (undated) DEVICE — CODMAN® SURGICAL PATTIES 1/2" X 3" (1.27CM X 7.62CM): Brand: CODMAN®

## (undated) DEVICE — DRESSING FOAM W4XL10IN AG SIL ADH ANTIMIC POSTOP OPTIFOAM

## (undated) DEVICE — C-ARM: Brand: UNBRANDED

## (undated) DEVICE — CODMAN® SURGICAL PATTIES 1/2" X 1/2" (1.27CM X 1.27CM): Brand: CODMAN®

## (undated) DEVICE — DEFENDO AIR WATER SUCTION AND BIOPSY VALVE KIT FOR  OLYMPUS: Brand: DEFENDO AIR/WATER/SUCTION AND BIOPSY VALVE

## (undated) DEVICE — SINGLE PORT MANIFOLD: Brand: NEPTUNE 2

## (undated) DEVICE — DRAPE,UTILTY,TAPE,15X26, 4EA/PK: Brand: MEDLINE

## (undated) DEVICE — GLOVE SURG SZ 65 CRM LTX FREE POLYISOPRENE POLYMER BEAD ANTI

## (undated) DEVICE — DRESSING TRNSPAR W5XL4.5IN FLM SHT SEMIPERMEABLE WIND

## (undated) DEVICE — GLOVE SURG SZ 75 CRM LTX FREE POLYISOPRENE POLYMER BEAD ANTI

## (undated) DEVICE — ST CHARLES MINOR ABDOMINAL PK: Brand: MEDLINE INDUSTRIES, INC.

## (undated) DEVICE — TUBE ET DIA8MM ORAL NSL CUF MURPHY EYE HI LO RADPQ LN DISP

## (undated) DEVICE — DRESSING BORDERED ADH GZ UNIV GEN USE 8INX4IN AND 6INX2IN

## (undated) DEVICE — SHEET, T, LAPAROTOMY, STERILE: Brand: MEDLINE

## (undated) DEVICE — COVER,MAYO STAND,STERILE: Brand: MEDLINE

## (undated) DEVICE — GOWN,AURORA,NONREINFORCED,LARGE: Brand: MEDLINE

## (undated) DEVICE — 1010 S-DRAPE TOWEL DRAPE 10/BX: Brand: STERI-DRAPE™

## (undated) DEVICE — MARKER,SKIN,WI/RULER AND LABELS: Brand: MEDLINE

## (undated) DEVICE — HYPODERMIC SAFETY NEEDLE: Brand: MAGELLAN

## (undated) DEVICE — STERILE COTTON BALLS LARGE 5/P: Brand: MEDLINE

## (undated) DEVICE — STRAP,POSITIONING,KNEE/BODY,FOAM,4X60": Brand: MEDLINE

## (undated) DEVICE — Device

## (undated) DEVICE — DISPOSABLE IRRIGATION BIPOLAR CORD: Brand: KIRWAN

## (undated) DEVICE — ELECTRODE ES L3IN S STL BLDE INSUL DISP VALLEYLAB EDGE

## (undated) DEVICE — SNARE ENDOSCP AD SM L240CM LOOP W1.3CM SHTH DIA2.4MM POLYP

## (undated) DEVICE — GLOVE SURG SZ 75 L12IN FNGR THK79MIL GRN LTX FREE

## (undated) DEVICE — INTENDED FOR TISSUE SEPARATION, AND OTHER PROCEDURES THAT REQUIRE A SHARP SURGICAL BLADE TO PUNCTURE OR CUT.: Brand: BARD-PARKER ® CARBON RIB-BACK BLADES

## (undated) DEVICE — NEPTUNE E-SEP 165MM SUCTION SLEEVE: Brand: NEPTUNE E-SEP

## (undated) DEVICE — ENDO KIT W/SYRINGE: Brand: MEDLINE INDUSTRIES, INC.

## (undated) DEVICE — SYRINGE MED 10ML TRNSLUC BRL PLUNG BLK MRK POLYPR CTRL

## (undated) DEVICE — SKIN PREP TRAY W/CHG: Brand: MEDLINE INDUSTRIES, INC.

## (undated) DEVICE — SUTURE ETHBND EXCEL SZ 0 L18IN NONABSORBABLE GRN L26MM MO-6 CX45D

## (undated) DEVICE — SOLUTION IRRIG 1000ML 0.9% SOD CHL USP POUR PLAS BTL

## (undated) DEVICE — 3.0MM PRECISION NEURO (MATCH HEAD)

## (undated) DEVICE — POLYP TRAP: Brand: TRAPEASE®

## (undated) DEVICE — SUTURE VCRL + SZ 3-0 L27IN ABSRB UD L26MM SH 1/2 CIR VCP416H

## (undated) DEVICE — GAUZE,SPONGE,4"X4",16PLY,XRAY,STRL,LF: Brand: MEDLINE

## (undated) DEVICE — NEEDLE HYPO 25GA L1.5IN BLU POLYPR HUB S STL REG BVL STR

## (undated) DEVICE — SUTURE D SPEC VCRL 2 0 D8876

## (undated) DEVICE — OPTIFOAM GENTLE AG,POST-OP STRIP,3.5X14: Brand: MEDLINE

## (undated) DEVICE — Z DISCONTINUED GLOVE SURG SZ 7.5 L12IN FNGR THK13MIL WHT ISOLEX

## (undated) DEVICE — TUBING, SUCTION, 1/4" X 12', STRAIGHT: Brand: MEDLINE

## (undated) DEVICE — GLOVE ORANGE PI 7 1/2   MSG9075